# Patient Record
Sex: FEMALE | Race: WHITE | NOT HISPANIC OR LATINO | Employment: FULL TIME | URBAN - METROPOLITAN AREA
[De-identification: names, ages, dates, MRNs, and addresses within clinical notes are randomized per-mention and may not be internally consistent; named-entity substitution may affect disease eponyms.]

---

## 2022-11-04 RX ORDER — TRIAMCINOLONE ACETONIDE 5 MG/G
CREAM TOPICAL 2 TIMES DAILY PRN
COMMUNITY

## 2022-11-04 RX ORDER — NORTRIPTYLINE HYDROCHLORIDE 50 MG/1
50 CAPSULE ORAL 2 TIMES DAILY
COMMUNITY

## 2022-11-04 RX ORDER — ONDANSETRON 4 MG/1
4 TABLET, ORALLY DISINTEGRATING ORAL EVERY 8 HOURS PRN
COMMUNITY

## 2022-11-04 RX ORDER — MULTIVIT WITH MINERALS/LUTEIN
1 TABLET ORAL DAILY
COMMUNITY

## 2022-11-04 RX ORDER — TOPIRAMATE 50 MG/1
50 TABLET, FILM COATED ORAL 2 TIMES DAILY
COMMUNITY

## 2022-11-04 NOTE — PROGRESS NOTES
PTA medications updated by Medication Scribe prior to surgery via phone call with patient (last doses completed by Nurse)     Medication history sources: Patient  In the past week, patient estimated taking medication this percent of the time: Greater than 90%  Adherence assessment: Moderate    Significant changes made to the medication list:  Patient reports no longer taking the following meds (med scribe removed from PTA med list): prenatal; Calcium      Additional medication history information:   None    Medication reconciliation completed by provider prior to medication history? No    Time spent in this activity: 40 minutes    The information provided in this note is only as accurate as the sources available at the time of update(s)      Prior to Admission medications    Medication Sig Last Dose Taking? Auth Provider Long Term End Date   Acetaminophen (TYLENOL PO) Take 1,000 mg by mouth every 6 hours as needed for mild pain or fever 11/3/2022 Yes Reported, Patient     cimetidine (TAGAMET) 200 MG tablet Take 200 mg by mouth 2 times daily as needed  Yes Reported, Patient     citalopram (CELEXA) 20 MG tablet Take 20 mg by mouth every morning 11/4/2022 Yes Reported, Patient Yes    multivitamin (CENTRUM SILVER) tablet Take 1 tablet by mouth daily 11/3/2022 Yes Reported, Patient     nortriptyline (PAMELOR) 50 MG capsule Take 50 mg by mouth 2 times daily 11/3/2022 Yes Reported, Patient Yes    ondansetron (ZOFRAN ODT) 4 MG ODT tab Take 4 mg by mouth every 8 hours as needed for nausea  Yes Reported, Patient     topiramate (TOPAMAX) 50 MG tablet Take 50 mg by mouth 2 times daily  Yes Reported, Patient Yes    traZODone (DESYREL) 50 MG tablet Take 50 mg by mouth At Bedtime 11/3/2022 Yes Reported, Patient Yes    triamcinolone (ARISTOCORT HP) 0.5 % external cream Apply topically 2 times daily as needed  Yes Reported, Patient     loratadine (CLARITIN) 10 MG tablet Take 10 mg by mouth daily 10/28/2022  Reported, Patient      SUMAtriptan (IMITREX) 100 MG tablet Take 100 mg by mouth as needed for migraine 10/31/2022  Reported, Patient

## 2022-11-06 ENCOUNTER — ANESTHESIA EVENT (OUTPATIENT)
Dept: SURGERY | Facility: CLINIC | Age: 52
End: 2022-11-06
Payer: OTHER GOVERNMENT

## 2022-11-07 ENCOUNTER — APPOINTMENT (OUTPATIENT)
Dept: PHYSICAL THERAPY | Facility: CLINIC | Age: 52
End: 2022-11-07
Attending: ORTHOPAEDIC SURGERY
Payer: OTHER GOVERNMENT

## 2022-11-07 ENCOUNTER — ANESTHESIA (OUTPATIENT)
Dept: SURGERY | Facility: CLINIC | Age: 52
End: 2022-11-07
Payer: OTHER GOVERNMENT

## 2022-11-07 ENCOUNTER — HOSPITAL ENCOUNTER (OUTPATIENT)
Facility: CLINIC | Age: 52
Discharge: HOME | End: 2022-11-08
Attending: ORTHOPAEDIC SURGERY | Admitting: ORTHOPAEDIC SURGERY
Payer: OTHER GOVERNMENT

## 2022-11-07 ENCOUNTER — APPOINTMENT (OUTPATIENT)
Dept: GENERAL RADIOLOGY | Facility: CLINIC | Age: 52
End: 2022-11-07
Attending: ORTHOPAEDIC SURGERY
Payer: OTHER GOVERNMENT

## 2022-11-07 ENCOUNTER — APPOINTMENT (OUTPATIENT)
Dept: GENERAL RADIOLOGY | Facility: CLINIC | Age: 52
End: 2022-11-07
Attending: PHYSICIAN ASSISTANT
Payer: OTHER GOVERNMENT

## 2022-11-07 DIAGNOSIS — Z96.641 S/P TOTAL RIGHT HIP ARTHROPLASTY: Primary | ICD-10-CM

## 2022-11-07 PROCEDURE — 250N000025 HC SEVOFLURANE, PER MIN: Performed by: ORTHOPAEDIC SURGERY

## 2022-11-07 PROCEDURE — 97530 THERAPEUTIC ACTIVITIES: CPT | Mod: GP

## 2022-11-07 PROCEDURE — 360N000084 HC SURGERY LEVEL 4 W/ FLUORO, PER MIN: Performed by: ORTHOPAEDIC SURGERY

## 2022-11-07 PROCEDURE — 370N000017 HC ANESTHESIA TECHNICAL FEE, PER MIN: Performed by: ORTHOPAEDIC SURGERY

## 2022-11-07 PROCEDURE — 999N000179 XR SURGERY CARM FLUORO LESS THAN 5 MIN W STILLS

## 2022-11-07 PROCEDURE — 250N000013 HC RX MED GY IP 250 OP 250 PS 637: Performed by: ORTHOPAEDIC SURGERY

## 2022-11-07 PROCEDURE — 250N000013 HC RX MED GY IP 250 OP 250 PS 637: Performed by: PHYSICIAN ASSISTANT

## 2022-11-07 PROCEDURE — 250N000011 HC RX IP 250 OP 636: Performed by: ORTHOPAEDIC SURGERY

## 2022-11-07 PROCEDURE — 258N000001 HC RX 258: Performed by: ORTHOPAEDIC SURGERY

## 2022-11-07 PROCEDURE — 258N000003 HC RX IP 258 OP 636: Performed by: ANESTHESIOLOGY

## 2022-11-07 PROCEDURE — 999N000063 XR PELVIS AND HIP PORTABLE RIGHT 1 VIEW

## 2022-11-07 PROCEDURE — 97116 GAIT TRAINING THERAPY: CPT | Mod: GP

## 2022-11-07 PROCEDURE — 250N000011 HC RX IP 250 OP 636: Performed by: PHYSICIAN ASSISTANT

## 2022-11-07 PROCEDURE — 272N000001 HC OR GENERAL SUPPLY STERILE: Performed by: ORTHOPAEDIC SURGERY

## 2022-11-07 PROCEDURE — C1713 ANCHOR/SCREW BN/BN,TIS/BN: HCPCS | Performed by: ORTHOPAEDIC SURGERY

## 2022-11-07 PROCEDURE — 258N000003 HC RX IP 258 OP 636: Performed by: ORTHOPAEDIC SURGERY

## 2022-11-07 PROCEDURE — 710N000009 HC RECOVERY PHASE 1, LEVEL 1, PER MIN: Performed by: ORTHOPAEDIC SURGERY

## 2022-11-07 PROCEDURE — C1776 JOINT DEVICE (IMPLANTABLE): HCPCS | Performed by: ORTHOPAEDIC SURGERY

## 2022-11-07 PROCEDURE — 999N000141 HC STATISTIC PRE-PROCEDURE NURSING ASSESSMENT: Performed by: ORTHOPAEDIC SURGERY

## 2022-11-07 PROCEDURE — 250N000009 HC RX 250: Performed by: ANESTHESIOLOGY

## 2022-11-07 PROCEDURE — 258N000003 HC RX IP 258 OP 636: Performed by: PHYSICIAN ASSISTANT

## 2022-11-07 PROCEDURE — 250N000011 HC RX IP 250 OP 636: Performed by: ANESTHESIOLOGY

## 2022-11-07 PROCEDURE — 250N000009 HC RX 250: Performed by: ORTHOPAEDIC SURGERY

## 2022-11-07 PROCEDURE — 97161 PT EVAL LOW COMPLEX 20 MIN: CPT | Mod: GP

## 2022-11-07 DEVICE — IMPLANTABLE DEVICE
Type: IMPLANTABLE DEVICE | Site: HIP | Status: FUNCTIONAL
Brand: TAPERLOC COMPLETE PRIMARY FEMORAL

## 2022-11-07 DEVICE — IMPLANTABLE DEVICE
Type: IMPLANTABLE DEVICE | Site: HIP | Status: FUNCTIONAL
Brand: G7® ACETABULAR SYSTEM

## 2022-11-07 DEVICE — IMPLANTABLE DEVICE
Type: IMPLANTABLE DEVICE | Site: HIP | Status: FUNCTIONAL
Brand: G7® VIVACIT-E®

## 2022-11-07 DEVICE — IMPLANTABLE DEVICE
Type: IMPLANTABLE DEVICE | Site: HIP | Status: FUNCTIONAL
Brand: BIOLOX® DELTA MODULAR CERAMIC HEAD

## 2022-11-07 DEVICE — IMPLANTABLE DEVICE: Type: IMPLANTABLE DEVICE | Site: HIP | Status: FUNCTIONAL

## 2022-11-07 RX ORDER — HYDROMORPHONE HCL IN WATER/PF 6 MG/30 ML
0.2 PATIENT CONTROLLED ANALGESIA SYRINGE INTRAVENOUS EVERY 5 MIN PRN
Status: DISCONTINUED | OUTPATIENT
Start: 2022-11-07 | End: 2022-11-07 | Stop reason: HOSPADM

## 2022-11-07 RX ORDER — AMOXICILLIN 250 MG
1 CAPSULE ORAL 2 TIMES DAILY
Status: DISCONTINUED | OUTPATIENT
Start: 2022-11-07 | End: 2022-11-08 | Stop reason: HOSPADM

## 2022-11-07 RX ORDER — SUMATRIPTAN 50 MG/1
100 TABLET, FILM COATED ORAL EVERY 8 HOURS PRN
Status: DISCONTINUED | OUTPATIENT
Start: 2022-11-07 | End: 2022-11-08 | Stop reason: HOSPADM

## 2022-11-07 RX ORDER — NALOXONE HYDROCHLORIDE 0.4 MG/ML
0.4 INJECTION, SOLUTION INTRAMUSCULAR; INTRAVENOUS; SUBCUTANEOUS
Status: DISCONTINUED | OUTPATIENT
Start: 2022-11-07 | End: 2022-11-08 | Stop reason: HOSPADM

## 2022-11-07 RX ORDER — FAMOTIDINE 20 MG/1
20 TABLET, FILM COATED ORAL 2 TIMES DAILY
Status: DISCONTINUED | OUTPATIENT
Start: 2022-11-07 | End: 2022-11-08 | Stop reason: HOSPADM

## 2022-11-07 RX ORDER — NALOXONE HYDROCHLORIDE 0.4 MG/ML
0.2 INJECTION, SOLUTION INTRAMUSCULAR; INTRAVENOUS; SUBCUTANEOUS
Status: DISCONTINUED | OUTPATIENT
Start: 2022-11-07 | End: 2022-11-08 | Stop reason: HOSPADM

## 2022-11-07 RX ORDER — ACETAMINOPHEN 325 MG/1
650 TABLET ORAL EVERY 4 HOURS PRN
Status: DISCONTINUED | OUTPATIENT
Start: 2022-11-10 | End: 2022-11-08 | Stop reason: HOSPADM

## 2022-11-07 RX ORDER — FENTANYL CITRATE 0.05 MG/ML
25 INJECTION, SOLUTION INTRAMUSCULAR; INTRAVENOUS EVERY 5 MIN PRN
Status: DISCONTINUED | OUTPATIENT
Start: 2022-11-07 | End: 2022-11-07 | Stop reason: HOSPADM

## 2022-11-07 RX ORDER — ASPIRIN 81 MG/1
81 TABLET ORAL 2 TIMES DAILY
Status: DISCONTINUED | OUTPATIENT
Start: 2022-11-07 | End: 2022-11-08 | Stop reason: HOSPADM

## 2022-11-07 RX ORDER — ONDANSETRON 4 MG/1
4 TABLET, ORALLY DISINTEGRATING ORAL EVERY 6 HOURS PRN
Status: DISCONTINUED | OUTPATIENT
Start: 2022-11-07 | End: 2022-11-08 | Stop reason: HOSPADM

## 2022-11-07 RX ORDER — CEFAZOLIN SODIUM/WATER 2 G/20 ML
2 SYRINGE (ML) INTRAVENOUS SEE ADMIN INSTRUCTIONS
Status: DISCONTINUED | OUTPATIENT
Start: 2022-11-07 | End: 2022-11-07 | Stop reason: HOSPADM

## 2022-11-07 RX ORDER — TRAZODONE HYDROCHLORIDE 50 MG/1
50 TABLET, FILM COATED ORAL AT BEDTIME
Status: DISCONTINUED | OUTPATIENT
Start: 2022-11-07 | End: 2022-11-08 | Stop reason: HOSPADM

## 2022-11-07 RX ORDER — ASPIRIN 81 MG/1
81 TABLET ORAL 2 TIMES DAILY
Qty: 60 TABLET | Refills: 0 | Status: SHIPPED | OUTPATIENT
Start: 2022-11-07

## 2022-11-07 RX ORDER — CEFAZOLIN SODIUM/WATER 2 G/20 ML
2 SYRINGE (ML) INTRAVENOUS
Status: COMPLETED | OUTPATIENT
Start: 2022-11-07 | End: 2022-11-07

## 2022-11-07 RX ORDER — DEXAMETHASONE SODIUM PHOSPHATE 4 MG/ML
INJECTION, SOLUTION INTRA-ARTICULAR; INTRALESIONAL; INTRAMUSCULAR; INTRAVENOUS; SOFT TISSUE PRN
Status: DISCONTINUED | OUTPATIENT
Start: 2022-11-07 | End: 2022-11-07

## 2022-11-07 RX ORDER — CEFAZOLIN SODIUM 2 G/100ML
2 INJECTION, SOLUTION INTRAVENOUS EVERY 8 HOURS
Status: COMPLETED | OUTPATIENT
Start: 2022-11-07 | End: 2022-11-07

## 2022-11-07 RX ORDER — OXYCODONE HYDROCHLORIDE 5 MG/1
5 TABLET ORAL EVERY 4 HOURS PRN
Status: DISCONTINUED | OUTPATIENT
Start: 2022-11-07 | End: 2022-11-07 | Stop reason: HOSPADM

## 2022-11-07 RX ORDER — HYDROMORPHONE HCL IN WATER/PF 6 MG/30 ML
0.4 PATIENT CONTROLLED ANALGESIA SYRINGE INTRAVENOUS
Status: DISCONTINUED | OUTPATIENT
Start: 2022-11-07 | End: 2022-11-08 | Stop reason: HOSPADM

## 2022-11-07 RX ORDER — CITALOPRAM HYDROBROMIDE 20 MG/1
20 TABLET ORAL AT BEDTIME
Status: DISCONTINUED | OUTPATIENT
Start: 2022-11-08 | End: 2022-11-08 | Stop reason: HOSPADM

## 2022-11-07 RX ORDER — MAGNESIUM HYDROXIDE 1200 MG/15ML
LIQUID ORAL PRN
Status: DISCONTINUED | OUTPATIENT
Start: 2022-11-07 | End: 2022-11-07 | Stop reason: HOSPADM

## 2022-11-07 RX ORDER — PROPOFOL 10 MG/ML
INJECTION, EMULSION INTRAVENOUS PRN
Status: DISCONTINUED | OUTPATIENT
Start: 2022-11-07 | End: 2022-11-07

## 2022-11-07 RX ORDER — HYDROMORPHONE HYDROCHLORIDE 1 MG/ML
INJECTION, SOLUTION INTRAMUSCULAR; INTRAVENOUS; SUBCUTANEOUS PRN
Status: DISCONTINUED | OUTPATIENT
Start: 2022-11-07 | End: 2022-11-07

## 2022-11-07 RX ORDER — POLYETHYLENE GLYCOL 3350 17 G/17G
17 POWDER, FOR SOLUTION ORAL DAILY
Status: DISCONTINUED | OUTPATIENT
Start: 2022-11-08 | End: 2022-11-08 | Stop reason: HOSPADM

## 2022-11-07 RX ORDER — ONDANSETRON 2 MG/ML
4 INJECTION INTRAMUSCULAR; INTRAVENOUS EVERY 30 MIN PRN
Status: DISCONTINUED | OUTPATIENT
Start: 2022-11-07 | End: 2022-11-07 | Stop reason: HOSPADM

## 2022-11-07 RX ORDER — TOPIRAMATE 50 MG/1
50 TABLET, FILM COATED ORAL 2 TIMES DAILY
Status: DISCONTINUED | OUTPATIENT
Start: 2022-11-07 | End: 2022-11-08 | Stop reason: HOSPADM

## 2022-11-07 RX ORDER — ACETAMINOPHEN 325 MG/1
975 TABLET ORAL EVERY 8 HOURS
Status: DISCONTINUED | OUTPATIENT
Start: 2022-11-07 | End: 2022-11-08 | Stop reason: HOSPADM

## 2022-11-07 RX ORDER — ONDANSETRON 2 MG/ML
INJECTION INTRAMUSCULAR; INTRAVENOUS PRN
Status: DISCONTINUED | OUTPATIENT
Start: 2022-11-07 | End: 2022-11-07

## 2022-11-07 RX ORDER — FENTANYL CITRATE 50 UG/ML
INJECTION, SOLUTION INTRAMUSCULAR; INTRAVENOUS PRN
Status: DISCONTINUED | OUTPATIENT
Start: 2022-11-07 | End: 2022-11-07

## 2022-11-07 RX ORDER — OXYCODONE HYDROCHLORIDE 5 MG/1
5 TABLET ORAL EVERY 4 HOURS PRN
Status: DISCONTINUED | OUTPATIENT
Start: 2022-11-07 | End: 2022-11-08 | Stop reason: HOSPADM

## 2022-11-07 RX ORDER — PROCHLORPERAZINE MALEATE 10 MG
10 TABLET ORAL EVERY 6 HOURS PRN
Status: DISCONTINUED | OUTPATIENT
Start: 2022-11-07 | End: 2022-11-08 | Stop reason: HOSPADM

## 2022-11-07 RX ORDER — TRANEXAMIC ACID 650 MG/1
1950 TABLET ORAL ONCE
Status: COMPLETED | OUTPATIENT
Start: 2022-11-07 | End: 2022-11-07

## 2022-11-07 RX ORDER — SODIUM CHLORIDE, SODIUM LACTATE, POTASSIUM CHLORIDE, CALCIUM CHLORIDE 600; 310; 30; 20 MG/100ML; MG/100ML; MG/100ML; MG/100ML
INJECTION, SOLUTION INTRAVENOUS CONTINUOUS
Status: DISCONTINUED | OUTPATIENT
Start: 2022-11-07 | End: 2022-11-08 | Stop reason: HOSPADM

## 2022-11-07 RX ORDER — SODIUM CHLORIDE, SODIUM LACTATE, POTASSIUM CHLORIDE, CALCIUM CHLORIDE 600; 310; 30; 20 MG/100ML; MG/100ML; MG/100ML; MG/100ML
INJECTION, SOLUTION INTRAVENOUS CONTINUOUS
Status: DISCONTINUED | OUTPATIENT
Start: 2022-11-07 | End: 2022-11-07 | Stop reason: HOSPADM

## 2022-11-07 RX ORDER — LIDOCAINE 40 MG/G
CREAM TOPICAL
Status: DISCONTINUED | OUTPATIENT
Start: 2022-11-07 | End: 2022-11-08 | Stop reason: HOSPADM

## 2022-11-07 RX ORDER — HYDROMORPHONE HCL IN WATER/PF 6 MG/30 ML
0.2 PATIENT CONTROLLED ANALGESIA SYRINGE INTRAVENOUS
Status: DISCONTINUED | OUTPATIENT
Start: 2022-11-07 | End: 2022-11-08 | Stop reason: HOSPADM

## 2022-11-07 RX ORDER — OXYCODONE HYDROCHLORIDE 5 MG/1
10 TABLET ORAL EVERY 4 HOURS PRN
Status: DISCONTINUED | OUTPATIENT
Start: 2022-11-07 | End: 2022-11-08 | Stop reason: HOSPADM

## 2022-11-07 RX ORDER — ACETAMINOPHEN 325 MG/1
650 TABLET ORAL EVERY 4 HOURS PRN
Qty: 100 TABLET | Refills: 0 | Status: SHIPPED | OUTPATIENT
Start: 2022-11-07

## 2022-11-07 RX ORDER — BISACODYL 10 MG
10 SUPPOSITORY, RECTAL RECTAL DAILY PRN
Status: DISCONTINUED | OUTPATIENT
Start: 2022-11-07 | End: 2022-11-08 | Stop reason: HOSPADM

## 2022-11-07 RX ORDER — AMOXICILLIN 250 MG
1-2 CAPSULE ORAL 2 TIMES DAILY
Qty: 30 TABLET | Refills: 0 | Status: SHIPPED | OUTPATIENT
Start: 2022-11-07

## 2022-11-07 RX ORDER — OXYCODONE HYDROCHLORIDE 5 MG/1
5-10 TABLET ORAL EVERY 4 HOURS PRN
Qty: 26 TABLET | Refills: 0 | Status: SHIPPED | OUTPATIENT
Start: 2022-11-07

## 2022-11-07 RX ORDER — ONDANSETRON 2 MG/ML
4 INJECTION INTRAMUSCULAR; INTRAVENOUS EVERY 6 HOURS PRN
Status: DISCONTINUED | OUTPATIENT
Start: 2022-11-07 | End: 2022-11-08 | Stop reason: HOSPADM

## 2022-11-07 RX ORDER — POLYETHYLENE GLYCOL 3350 17 G/17G
1 POWDER, FOR SOLUTION ORAL DAILY
Qty: 7 PACKET | Refills: 0 | Status: SHIPPED | OUTPATIENT
Start: 2022-11-07

## 2022-11-07 RX ORDER — NORTRIPTYLINE HYDROCHLORIDE 50 MG/1
50 CAPSULE ORAL 2 TIMES DAILY
Status: DISCONTINUED | OUTPATIENT
Start: 2022-11-07 | End: 2022-11-08 | Stop reason: HOSPADM

## 2022-11-07 RX ORDER — LIDOCAINE HYDROCHLORIDE 20 MG/ML
INJECTION, SOLUTION INFILTRATION; PERINEURAL PRN
Status: DISCONTINUED | OUTPATIENT
Start: 2022-11-07 | End: 2022-11-07

## 2022-11-07 RX ORDER — ACETAMINOPHEN 325 MG/1
975 TABLET ORAL ONCE
Status: COMPLETED | OUTPATIENT
Start: 2022-11-07 | End: 2022-11-07

## 2022-11-07 RX ORDER — ONDANSETRON 4 MG/1
4 TABLET, ORALLY DISINTEGRATING ORAL EVERY 30 MIN PRN
Status: DISCONTINUED | OUTPATIENT
Start: 2022-11-07 | End: 2022-11-07 | Stop reason: HOSPADM

## 2022-11-07 RX ADMIN — CEFAZOLIN SODIUM 2 G: 2 INJECTION, SOLUTION INTRAVENOUS at 22:41

## 2022-11-07 RX ADMIN — FAMOTIDINE 20 MG: 20 TABLET ORAL at 13:41

## 2022-11-07 RX ADMIN — NORTRIPTYLINE HYDROCHLORIDE 50 MG: 50 CAPSULE ORAL at 21:08

## 2022-11-07 RX ADMIN — HYDROMORPHONE HYDROCHLORIDE 0.5 MG: 1 INJECTION, SOLUTION INTRAMUSCULAR; INTRAVENOUS; SUBCUTANEOUS at 09:07

## 2022-11-07 RX ADMIN — ASPIRIN 81 MG: 81 TABLET, COATED ORAL at 21:09

## 2022-11-07 RX ADMIN — ACETAMINOPHEN 975 MG: 325 TABLET, FILM COATED ORAL at 21:08

## 2022-11-07 RX ADMIN — ACETAMINOPHEN 975 MG: 325 TABLET, FILM COATED ORAL at 06:06

## 2022-11-07 RX ADMIN — SODIUM CHLORIDE, POTASSIUM CHLORIDE, SODIUM LACTATE AND CALCIUM CHLORIDE: 600; 310; 30; 20 INJECTION, SOLUTION INTRAVENOUS at 21:10

## 2022-11-07 RX ADMIN — HYDROMORPHONE HYDROCHLORIDE 0.5 MG: 1 INJECTION, SOLUTION INTRAMUSCULAR; INTRAVENOUS; SUBCUTANEOUS at 08:08

## 2022-11-07 RX ADMIN — SENNOSIDES AND DOCUSATE SODIUM 1 TABLET: 50; 8.6 TABLET ORAL at 21:09

## 2022-11-07 RX ADMIN — OXYCODONE HYDROCHLORIDE 5 MG: 5 TABLET ORAL at 21:09

## 2022-11-07 RX ADMIN — CEFAZOLIN SODIUM 2 G: 2 INJECTION, SOLUTION INTRAVENOUS at 14:59

## 2022-11-07 RX ADMIN — PROPOFOL 200 MG: 10 INJECTION, EMULSION INTRAVENOUS at 07:33

## 2022-11-07 RX ADMIN — Medication 2 G: at 07:36

## 2022-11-07 RX ADMIN — LIDOCAINE HYDROCHLORIDE 100 MG: 20 INJECTION, SOLUTION INFILTRATION; PERINEURAL at 07:33

## 2022-11-07 RX ADMIN — TRAZODONE HYDROCHLORIDE 50 MG: 50 TABLET ORAL at 21:09

## 2022-11-07 RX ADMIN — SODIUM CHLORIDE, POTASSIUM CHLORIDE, SODIUM LACTATE AND CALCIUM CHLORIDE: 600; 310; 30; 20 INJECTION, SOLUTION INTRAVENOUS at 07:28

## 2022-11-07 RX ADMIN — ONDANSETRON 4 MG: 2 INJECTION INTRAMUSCULAR; INTRAVENOUS at 09:07

## 2022-11-07 RX ADMIN — PHENYLEPHRINE HYDROCHLORIDE 50 MCG: 10 INJECTION INTRAVENOUS at 08:31

## 2022-11-07 RX ADMIN — ASPIRIN 81 MG: 81 TABLET, COATED ORAL at 13:41

## 2022-11-07 RX ADMIN — NORTRIPTYLINE HYDROCHLORIDE 50 MG: 50 CAPSULE ORAL at 14:59

## 2022-11-07 RX ADMIN — TOPIRAMATE 50 MG: 50 TABLET, FILM COATED ORAL at 13:41

## 2022-11-07 RX ADMIN — TOPIRAMATE 50 MG: 50 TABLET, FILM COATED ORAL at 21:09

## 2022-11-07 RX ADMIN — FENTANYL CITRATE 100 MCG: 50 INJECTION, SOLUTION INTRAMUSCULAR; INTRAVENOUS at 07:33

## 2022-11-07 RX ADMIN — TRANEXAMIC ACID 1950 MG: 650 TABLET ORAL at 06:06

## 2022-11-07 RX ADMIN — FAMOTIDINE 20 MG: 20 TABLET ORAL at 21:09

## 2022-11-07 RX ADMIN — FENTANYL CITRATE 100 MCG: 50 INJECTION, SOLUTION INTRAMUSCULAR; INTRAVENOUS at 08:03

## 2022-11-07 RX ADMIN — OXYCODONE HYDROCHLORIDE 5 MG: 5 TABLET ORAL at 14:59

## 2022-11-07 RX ADMIN — DEXAMETHASONE SODIUM PHOSPHATE 4 MG: 4 INJECTION, SOLUTION INTRA-ARTICULAR; INTRALESIONAL; INTRAMUSCULAR; INTRAVENOUS; SOFT TISSUE at 07:50

## 2022-11-07 RX ADMIN — ACETAMINOPHEN 975 MG: 325 TABLET, FILM COATED ORAL at 13:41

## 2022-11-07 RX ADMIN — MIDAZOLAM 2 MG: 1 INJECTION INTRAMUSCULAR; INTRAVENOUS at 07:29

## 2022-11-07 ASSESSMENT — ACTIVITIES OF DAILY LIVING (ADL)
ADLS_ACUITY_SCORE: 22

## 2022-11-07 NOTE — ANESTHESIA PREPROCEDURE EVALUATION
Prior to Admission medications    Medication Sig Start Date End Date Taking? Authorizing Provider   Acetaminophen (TYLENOL PO) Take 1,000 mg by mouth every 6 hours as needed for mild pain or fever   Yes Reported, Patient   cimetidine (TAGAMET) 200 MG tablet Take 200 mg by mouth 2 times daily as needed   Yes Reported, Patient   citalopram (CELEXA) 20 MG tablet Take 20 mg by mouth every morning   Yes Reported, Patient   multivitamin (CENTRUM SILVER) tablet Take 1 tablet by mouth daily   Yes Reported, Patient   nortriptyline (PAMELOR) 50 MG capsule Take 50 mg by mouth 2 times daily   Yes Reported, Patient   ondansetron (ZOFRAN ODT) 4 MG ODT tab Take 4 mg by mouth every 8 hours as needed for nausea   Yes Reported, Patient   topiramate (TOPAMAX) 50 MG tablet Take 50 mg by mouth 2 times daily   Yes Reported, Patient   traZODone (DESYREL) 50 MG tablet Take 50 mg by mouth At Bedtime   Yes Reported, Patient   triamcinolone (ARISTOCORT HP) 0.5 % external cream Apply topically 2 times daily as needed   Yes Reported, Patient   loratadine (CLARITIN) 10 MG tablet Take 10 mg by mouth daily    Reported, Patient   SUMAtriptan (IMITREX) 100 MG tablet Take 100 mg by mouth as needed for migraine    Reported, Patient     Current Facility-Administered Medications Ordered in Epic   Medication Dose Route Frequency Last Rate Last Admin     ceFAZolin Sodium (ANCEF) injection 2 g  2 g Intravenous Pre-Op/Pre-procedure x 1 dose         ceFAZolin Sodium (ANCEF) injection 2 g  2 g Intravenous See Admin Instructions         fentaNYL (PF) (SUBLIMAZE) injection 25 mcg  25 mcg Intravenous Q5 Min PRN         HYDROmorphone (DILAUDID) injection 0.2 mg  0.2 mg Intravenous Q5 Min PRN         lactated ringers infusion   Intravenous Continuous         lactated ringers infusion   Intravenous Continuous         lidocaine 1 % 0.1-1 mL  0.1-1 mL Other Q1H PRN         ondansetron (ZOFRAN ODT) ODT tab 4 mg  4 mg Oral Q30 Min PRN        Or     ondansetron (ZOFRAN)  "injection 4 mg  4 mg Intravenous Q30 Min PRN         oxyCODONE (ROXICODONE) tablet 5 mg  5 mg Oral Q4H PRN         prochlorperazine (COMPAZINE) injection 5 mg  5 mg Intravenous Q6H PRN         ropivacaine (NAROPIN) 300 mg, ketorolac (TORADOL) 30 mg, EPINEPHrine (ADRENALIN) 0.6 mg in sodium chloride 0.9 % 100 mL (ORTHO AHMET STANDARD DOSE)   INTRA-ARTICULAR On Call to OR         sodium chloride (PF) 0.9% PF flush 3 mL  3 mL Intracatheter Q8H         sodium chloride (PF) 0.9% PF flush 3 mL  3 mL Intracatheter q1 min prn         No current Epic-ordered outpatient medications on file.       lactated ringers       lactated ringers       No results for input(s): ABO, RH in the last 19455 hours.  No results for input(s): HCG in the last 01192 hours.  No results found for this or any previous visit (from the past 744 hour(s)).Anesthesia Pre-Procedure Evaluation    Patient: Yoly Chan   MRN: [id number] : 1970        Procedure : Procedure(s):  RIGHT TOTAL HIP ARTHROPLASTY, DIRECT ANTERIOR APPROACH          Past Medical History:   Diagnosis Date     Arthritis      Bilateral carpal tunnel syndrome      Depression      Depressive disorder      NIRMAL (generalized anxiety disorder)      Gastro-oesophageal reflux disease      Migraine      Migraine     1-2x monthly     Mood disorder (H)      Obesity      Sleep disturbance       Past Surgical History:   Procedure Laterality Date     ARTHROPLASTY HIP  2014    Procedure: ARTHROPLASTY HIP;  Surgeon: Barak Taylor MD;  Location:  OR     ORTHOPEDIC SURGERY      \"plates and screws ORIF left hip and femur      No Known Allergies   Social History     Tobacco Use     Smoking status: Never     Smokeless tobacco: Never   Substance Use Topics     Alcohol use: Yes     Comment: 2-3 drinks per week (beer cans)      Wt Readings from Last 1 Encounters:   22 111.1 kg (245 lb)        Anesthesia Evaluation   Pt has had prior anesthetic.     No history of " anesthetic complications       ROS/MED HX  ENT/Pulmonary:    (-) sleep apnea   Neurologic:     (+) migraines,     Cardiovascular:       METS/Exercise Tolerance:     Hematologic:       Musculoskeletal:       GI/Hepatic:     (+) GERD,     Renal/Genitourinary:       Endo:     (+) Obesity,     Psychiatric/Substance Use:     (+) psychiatric history depression     Infectious Disease:       Malignancy:       Other:            Physical Exam    Airway        Mallampati: I    Neck ROM: full     Respiratory Devices and Support         Dental  no notable dental history     (+) caps      Cardiovascular   cardiovascular exam normal          Pulmonary   pulmonary exam normal                OUTSIDE LABS:  CBC:   Lab Results   Component Value Date    HGB 10.5 (L) 05/24/2014    HGB 11.3 (L) 05/23/2014     05/25/2014     05/22/2014     BMP:   Lab Results   Component Value Date    CR 0.70 05/22/2014     COAGS: No results found for: PTT, INR, FIBR  POC:   Lab Results   Component Value Date    HCG Negative 05/22/2014     HEPATIC: No results found for: ALBUMIN, PROTTOTAL, ALT, AST, GGT, ALKPHOS, BILITOTAL, BILIDIRECT, JAVID  OTHER: No results found for: PH, LACT, A1C, AMANDA, PHOS, MAG, LIPASE, AMYLASE, TSH, T4, T3, CRP, SED    Anesthesia Plan    ASA Status:  2   NPO Status:  NPO Appropriate    Anesthesia Type: General.     - Airway: LMA   Induction: Intravenous.   Maintenance: Balanced.        Consents    Anesthesia Plan(s) and associated risks, benefits, and realistic alternatives discussed. Questions answered and patient/representative(s) expressed understanding.    - Discussed:     - Discussed with:  Patient         Postoperative Care    Pain management: IV analgesics.   PONV prophylaxis: Ondansetron (or other 5HT-3)     Comments:                Raymon Kwon MD

## 2022-11-07 NOTE — PROGRESS NOTES
11/07/22 1600   Appointment Info   Signing Clinician's Name / Credentials (PT) Francie Cano, PT, DPT   Quick Adds   Quick Adds Certification       Present no   Living Environment   People in Home spouse   Current Living Arrangements house   Home Accessibility stairs to enter home   Number of Stairs, Main Entrance 2   Stair Railings, Main Entrance none   Transportation Anticipated car, drives self;family or friend will provide   Living Environment Comments Patient lives with her spouse in a house.  There are 1 + 1 steps to access the home.  All patient's needs are met on the main floor of the home but she does have a exercise bike and treadmill in the basement.  There are 10-12 steps with single rail to basement.  Patient drives at baseline, plans to have  assist with transportation.   Self-Care   Usual Activity Tolerance good   Current Activity Tolerance good   Equipment Currently Used at Home none   Fall history within last six months no   Activity/Exercise/Self-Care Comment Patient reports baseline independence with I/ADLs and mobility.  She was an independent community ambulator.  Patient owns a standard walker and single point cane from L LUIS ANTONIO 8 years ago.   General Information   Onset of Illness/Injury or Date of Surgery 11/07/22   Referring Physician Jahaira Mtz   Patient/Family Therapy Goals Statement (PT) Patient would like to discharge home with .   Pertinent History of Current Problem (include personal factors and/or comorbidities that impact the POC) 52 year old female s/p right total hip arthroplasty   Existing Precautions/Restrictions fall;weight bearing   Weight-Bearing Status - RLE weight-bearing as tolerated   Cognition   Affect/Mental Status (Cognition) WFL   Orientation Status (Cognition) oriented x 4   Follows Commands (Cognition) WFL   Pain Assessment   Patient Currently in Pain Yes, see Vital Sign flowsheet  (3/10 right hip)   Integumentary/Edema    Integumentary/Edema Comments Incision right hip   Posture    Posture Forward head position   Range of Motion (ROM)   Range of Motion ROM is WFL   Strength (Manual Muscle Testing)   Strength (Manual Muscle Testing) Able to perform R SLR;Able to perform L SLR;Deficits observed during functional mobility   Bed Mobility   Comment, (Bed Mobility) Patient performs supine <> sit transfer IND, HOB flat and no rails per home set-up.   Transfers   Comment, (Transfers) Patient performs sit <> stand transfer from bed with SBA, demonstrates safe hand placement following education.   Gait/Stairs (Locomotion)   Distance in Feet (Gait) 15' eval + 125' treatment   Comment, (Gait/Stairs) Patient ambulates 15' in room with FWW, progressing from CGA to SBA.  Patient ambulating with step-through gait pattern, gait speed slowed but steady.   Balance   Balance Comments Impaired dynamic balance   Sensory Examination   Sensory Perception Comments Baseline neuropathy L foot   Clinical Impression   Criteria for Skilled Therapeutic Intervention Yes, treatment indicated   PT Diagnosis (PT) Impaired functional mobility   Influenced by the following impairments Right hip incision, right lower extremity pain, weakness, impaired balance, decreased activity tolerance   Functional limitations due to impairments Impaired independence with bed mobility, transfers, gait, and stair negotiation   Clinical Presentation (PT Evaluation Complexity) Stable/Uncomplicated   Clinical Presentation Rationale Clinical judgement, PMH, social support   Clinical Decision Making (Complexity) low complexity   Planned Therapy Interventions (PT) balance training;bed mobility training;cryotherapy;gait training;home exercise program;patient/family education;neuromuscular re-education;postural re-education;stair training;strengthening;transfer training;progressive activity/exercise   Anticipated Equipment Needs at Discharge (PT)   (Patient owns a standard walker and SPC,  may benefit from wheels on walker.)   Risk & Benefits of therapy have been explained evaluation/treatment results reviewed;care plan/treatment goals reviewed;risks/benefits reviewed;participants voiced agreement with care plan;participants included;patient   PT Total Evaluation Time   PT Eval, Low Complexity Minutes (02904) 10   Plan of Care Review   Plan of Care Reviewed With patient   Therapy Certification   Start of care date 11/07/22   Certification date from 11/07/22   Certification date to 11/14/22   Medical Diagnosis R LUIS ANTONIO   Physical Therapy Goals   PT Frequency 2x/day   PT Predicted Duration/Target Date for Goal Attainment 11/08/22   PT Goals Bed Mobility;Transfers;Gait;Stairs   PT: Bed Mobility Independent;Supine to/from sit;Goal Met   PT: Transfers Supervision/stand-by assist;Sit to/from stand;Bed to/from chair;Assistive device;Goal Met   PT: Gait Supervision/stand-by assist;100 feet;Rolling walker;Goal Met   PT: Stairs 2 stairs;Minimal assist;Assistive device   Interventions   Interventions Quick Adds Therapeutic Procedure;Gait Training;Therapeutic Activity   Therapeutic Procedure/Exercise   Ther. Procedure: strength, endurance, ROM, flexibillity Minutes (78470) 5  (Billed with TA)   Symptoms Noted During/After Treatment none   Treatment Detail/Skilled Intervention While supine in bed, patient educated for ankle pumps/circumduction, quad sets, glute squeezes, heel slides, SLRs, and LAQs.  Handout provided.  Patient familiar with post-LUIS ANTONIO exercises, was performing prior to surgery.  Patient encouraged to continue HEP for circulatory and strength benefits.   Therapeutic Activity   Therapeutic Activities: dynamic activities to improve functional performance Minutes (27244) 8   Symptoms Noted During/After Treatment None   Treatment Detail/Skilled Intervention PT:  Patient greeted supine in bed.  Patient educated on role of PT in acute care setting.  Patient agreeble to PT session despite 3/10 right hip pain.   Patient reporting L LUIS ANTONIO 8 years ago.  Patient IND for bed mobility with HOB flat and no bed rails per home set-up, exits to left.  Patient maintains static sitting balance at EOB x3 minutes with SBA, no c/o dizziness.  Patient performs x3 sit <> stands from bed with SBA, additional sit <> stand from chair with armrests with SBA.  Patient requesting to return to bed at end of session to elevate and ice R LE, IND for sit > supine.  Patient left with call light in reach and bed alarm activated.   Gait Training   Gait Training Minutes (79149) 15   Symptoms Noted During/After Treatment (Gait Training) fatigue   Treatment Detail/Skilled Intervention Patient ambulates 125' with FWW and SBA, demonstrates step-through gait pattern with no overt loss of balance.  One cue for upright posture with forward gaze with immediate improvement.  Patient educated for stair negotiation, completes x1 platform step with FWW and CGA (plus stabilizing walker).  Intermittent cues for step sequencing on stairs, patient with good adherence.   PT Discharge Planning   PT Plan 2 platform steps with spouse assisting, gait with walker   PT Discharge Recommendation (DC Rec)   (defer to ortho)   PT Rationale for DC Rec Patient presents below her IND baseline following R LUIS ANTONIO, currently IND for bed mob but needing SBA for transfers and ambulation with FWW.  Patient has two steps to access home.  Pending safe negotiation of stairs, anticipate patient will be safe to return home with assistance from .  Patient to continue walking regularly and performing HEP at discharge to address remaining strength, balance, and endurance deficits.  Patient owns a standard walker and SPC, may benefit from wheels for walker.   Total Session Time   Timed Code Treatment Minutes 28   Total Session Time (sum of timed and untimed services) 38     [facility]  OUTPATIENT PHYSICAL THERAPY EVALUATION  PLAN OF TREATMENT FOR OUTPATIENT  REHABILITATION  (COMPLETE FOR INITIAL CLAIMS ONLY)  Patient's Last Name, First Name, M.I.  YOB: 1970  Yoly Chan                        Provider's Name  [facility] Medical Record No.  [id number]                             Onset Date:  11/07/22   Start of Care Date:  11/07/22   Type:     _X_PT   ___OT   ___SLP Medical Diagnosis:  R LUIS ANTONIO              PT Diagnosis:  Impaired functional mobility Visits from SOC:  1     See note for plan of treatment, functional goals and certification details    I CERTIFY THE NEED FOR THESE SERVICES FURNISHED UNDER        THIS PLAN OF TREATMENT AND WHILE UNDER MY CARE     (Physician co-signature of this document indicates review and certification of the therapy plan).

## 2022-11-07 NOTE — PROGRESS NOTES
Arrived from Recovery room.  A&O, able to make needs known.   Oriented to room/unit.  Mild surgical pain, ice pack applied.  Pt's  at the bedside.  Ortho Stoplight Tool discussed w/ pt.

## 2022-11-07 NOTE — ANESTHESIA PROCEDURE NOTES
Airway    Staff -        CRNA: Dana Acosta APRN CRNA       Performed By: CRNAIndications and Patient Condition       Indications for airway management: namrata-procedural       Induction type:intravenous       Mask difficulty assessment: 0 - not attempted    Final Airway Details       Final airway type: supraglottic airway    Supraglottic Airway Details        Type: LMA       Brand: Ambu AuraGain       LMA size: 4    Post intubation assessment        Placement verified by: capnometry, equal breath sounds and chest rise        Number of attempts at approach: 1       Secured with: pink tape       Ease of procedure: easy       Dentition: Intact and Unchanged

## 2022-11-07 NOTE — OP NOTE
11/7/2022    Yoly Chan    Preoperative diagnosis: End-stage osteoarthritis right hip    Postoperative diagnosis: Same    Procedure: Total hip replacement right hip, direct anterior approach    Anesthesia: Gen.    Surgeon: Dr. Barak Taylor    Asst.: Jahaira Mtz PA-C    Description of procedure:  The patient is brought to the operating, supine upon a gurney.Preoperative antibiotic were given, as well as tranexamic acid.Gen. Anesthesia was induced. A Coleman catheter was placed. The patient was placed in the supine position on the Shannon table.  Preoperative  views of both hips are made.    The right lower extremity, flank, and anterior abdomen are prepped and draped in customary fashion. A brief timeout was held verify the procedure and laterality.    A slightly oblique incision was made over the tensor fascia segundo muscle. Dissection was carried out over the muscular fascia. The muscle was dissected off the fascia,and retracted laterally. The circumflex vessels were identified,cauterized, and divided.Cobra retractors were placed superiorly and inferiorly around the femoral neck. The hip capsule was opened in a T fashion, and the anterior capsule was excised. The Cobra retractors were now placed directly around the femoral neck. The osteotomy was made with an oscillating saw, and the femoral head and neck were extracted.A power corkscrew was utilized. During the osteotomy and extraction of the femoral head,a small amount of traction was placed on the limb. The labrum was debrided. The acetabulum was reamed with spherical reamers up to 51  mm. A Biomet G7 52 mm shell was impacted in place. A single 30 mm screw was placed up into the ilium, and a trial liner was placed.    All traction was released from the limb. The hip was extended, adducted, and externally rotated. A bone hook was used to keep the proximal femur from impinging on the acetabulum.The table hook was placed under the proximal femur,  and the femur was lifted anteriorly. Releases were performed on the medial surface of the greater trochanter, to facilitate placement of retractors. Broaching was performed with the Katie Biomet taperloc broaches up to size 12.  At intervals during the broaching process, x-rays were made to ensure proper placement. Ultimately, a size 12 taperloc stem was chosen and placed. Based on trial reductions, the standard offset was chosen.     A standard acetabular liner, size E, with a 36 mm Internal diameter was placed. We ultimately choose the standard femoral head, 36 mm in diameter. This was impacted on the taper, and final reductions were performed. Based on C-arm views, leg lengths appeared to be equal. The hip was stable even when traction was applied with a bone hook.    The wound was copiously irrigated with pulsatile lavage and saline solution. The capsule was re-approximated with the tagging sutures. The soft tissues were infiltrated with Marcaine and Toradol. The subcutaneous tissues were closed with 2-0 Vicryl, after closure of the fascia with 0 Vicryl. The skin was reapproximated with skin clips.    A bulky sterile dressing was applied. The patient left the room in satisfactory condition. Final counts were correct.

## 2022-11-07 NOTE — INTERVAL H&P NOTE
"I have reviewed the surgical (or preoperative) H&P that is linked to this encounter, and examined the patient. There are no significant changes    Clinical Conditions Present on Arrival:  Clinically Significant Risk Factors Present on Admission                    # Obesity: Estimated body mass index is 38.37 kg/m  as calculated from the following:    Height as of this encounter: 1.702 m (5' 7\").    Weight as of this encounter: 111.1 kg (245 lb).       "

## 2022-11-07 NOTE — ANESTHESIA POSTPROCEDURE EVALUATION
Patient: Yoly Chan    Procedure: Procedure(s):  RIGHT TOTAL HIP ARTHROPLASTY, DIRECT ANTERIOR APPROACH       Anesthesia Type:  General    Note:  Disposition: Inpatient   Postop Pain Control: Uneventful            Sign Out: Well controlled pain   PONV: No   Neuro/Psych: Uneventful            Sign Out: Acceptable/Baseline neuro status   Airway/Respiratory: Uneventful            Sign Out: Acceptable/Baseline resp. status   CV/Hemodynamics: Uneventful            Sign Out: Acceptable CV status; No obvious hypovolemia; No obvious fluid overload   Other NRE: NONE   DID A NON-ROUTINE EVENT OCCUR? No           Last vitals:  Vitals Value Taken Time   /65 11/07/22 1200   Temp 35.9  C (96.6  F) 11/07/22 0954   Pulse 96 11/07/22 1200   Resp 11 11/07/22 1200   SpO2 99 % 11/07/22 1200       Electronically Signed By: Raymon Kwon MD  November 7, 2022  2:14 PM

## 2022-11-07 NOTE — ANESTHESIA CARE TRANSFER NOTE
Patient: Yoly Chan    Procedure: Procedure(s):  RIGHT TOTAL HIP ARTHROPLASTY, DIRECT ANTERIOR APPROACH       Diagnosis: Primary osteoarthritis of right hip [M16.11]  Diagnosis Additional Information: No value filed.    Anesthesia Type:   General     Note:    Oropharynx: oropharynx clear of all foreign objects and spontaneously breathing  Level of Consciousness: awake  Oxygen Supplementation: face mask  Level of Supplemental Oxygen (L/min / FiO2): 6  Independent Airway: airway patency satisfactory and stable  Dentition: dentition unchanged  Vital Signs Stable: post-procedure vital signs reviewed and stable  Report to RN Given: handoff report given  Patient transferred to: PACU    Handoff Report: Identifed the Patient, Identified the Reponsible Provider, Reviewed the pertinent medical history, Discussed the surgical course, Reviewed Intra-OP anesthesia mangement and issues during anesthesia, Set expectations for post-procedure period and Allowed opportunity for questions and acknowledgement of understanding      Vitals:  Vitals Value Taken Time   /74 11/07/22 0954   Temp     Pulse 116 11/07/22 0955   Resp 10 11/07/22 0955   SpO2 98 % 11/07/22 0955   Vitals shown include unvalidated device data.    Electronically Signed By: JULIO Benavidez CRNA  November 7, 2022  9:56 AM

## 2022-11-07 NOTE — PROGRESS NOTES
11/7/2022    Yoly Chan    Alert and oriented in PACU    Pain well controlled    CMS intact (femoral and sciatic)    X-rays look great.

## 2022-11-07 NOTE — BRIEF OP NOTE
[facility]    Brief Operative Note    Pre-operative diagnosis: Primary osteoarthritis of right hip [M16.11]  Post-operative diagnosis osteoarthritis right hip    Procedure: Procedure(s):  RIGHT TOTAL HIP ARTHROPLASTY, DIRECT ANTERIOR APPROACH  Surgeon: Surgeon(s) and Role:     * Barak Taylor MD - Primary     * Jahaira Mtz - Assisting  Anesthesia: Choice   Estimated Blood Loss: 450 ml    Drains: None  Specimens: * No specimens in log *  Findings:   None.  Complications: None.  Implants:   Implant Name Type Inv. Item Serial No.  Lot No. LRB No. Used Action   IMP SHELL BIOM G7 ACETAB PPS BLAIR HOLE 52MM  E [id number] - [id number] Total Joint Component/Insert IMP SHELL BIOM G7 ACETAB PPS BLAIR HOLE 52MM  E [id number]  [employer] [id number] Right 1 Implanted   IMP SCR ZIM 6.5X30MM ACET CUP SELF TAP -684-30 - [id number] Metallic Hardware/[place] IMP SCR ZIM 6.5X30MM ACET CUP SELF TAP -945-30  [employer] [id number] Right 1 Implanted   LINER ACTB G7 E 36MM VIVACIT-E HIP STRL LUM LF [id number] - [id number] Total Joint Component/Insert LINER ACTB G7 E 36MM VIVACIT-E HIP STRL LUM LF [id number]  [employer] [id number] Right 1 Implanted   IMP STEM FEM BIOM TAPERLOC STD OFFSET TYPE 1 [facility] -120 - [id number] Total Joint Component/Insert IMP STEM FEM BIOM TAPERLOC STD OFFSET TYPE 1 [facility] -120  [employer] [id number] Right 1 Implanted   HEAD FEM 0MM OFST 36MM HIP ACTB MDLR TY 1 BLX D G7 - [id number] Total Joint Component/Insert HEAD FEM 0MM OFST 36MM HIP ACTB MDLR TY 1 BLX D G7  [employer] [id number] Right 1 Implanted

## 2022-11-07 NOTE — ANESTHESIA POSTPROCEDURE EVALUATION
Patient: Yoly Chan    Procedure: Procedure(s):  RIGHT TOTAL HIP ARTHROPLASTY, DIRECT ANTERIOR APPROACH       Anesthesia Type:  General    Note:  Disposition: Inpatient   Postop Pain Control: Uneventful            Sign Out: Well controlled pain   PONV: No   Neuro/Psych: Uneventful            Sign Out: Acceptable/Baseline neuro status   Airway/Respiratory: Uneventful            Sign Out: Acceptable/Baseline resp. status   CV/Hemodynamics: Uneventful            Sign Out: Acceptable CV status; No obvious hypovolemia; No obvious fluid overload   Other NRE: NONE   DID A NON-ROUTINE EVENT OCCUR? No           Last vitals:  Vitals Value Taken Time   /65 11/07/22 1200   Temp 35.9  C (96.6  F) 11/07/22 0954   Pulse 96 11/07/22 1200   Resp 11 11/07/22 1200   SpO2 99 % 11/07/22 1200       Electronically Signed By: Raymon Kwon MD  November 7, 2022  2:11 PM

## 2022-11-08 ENCOUNTER — APPOINTMENT (OUTPATIENT)
Dept: OCCUPATIONAL THERAPY | Facility: CLINIC | Age: 52
End: 2022-11-08
Attending: ORTHOPAEDIC SURGERY
Payer: OTHER GOVERNMENT

## 2022-11-08 ENCOUNTER — APPOINTMENT (OUTPATIENT)
Dept: PHYSICAL THERAPY | Facility: CLINIC | Age: 52
End: 2022-11-08
Attending: ORTHOPAEDIC SURGERY
Payer: OTHER GOVERNMENT

## 2022-11-08 VITALS
RESPIRATION RATE: 16 BRPM | DIASTOLIC BLOOD PRESSURE: 56 MMHG | OXYGEN SATURATION: 95 % | WEIGHT: 245 LBS | TEMPERATURE: 97.4 F | HEART RATE: 110 BPM | HEIGHT: 67 IN | SYSTOLIC BLOOD PRESSURE: 104 MMHG | BODY MASS INDEX: 38.45 KG/M2

## 2022-11-08 LAB
FASTING STATUS PATIENT QL REPORTED: NO
GLUCOSE BLD-MCNC: 133 MG/DL (ref 70–99)
HGB BLD-MCNC: 10.5 G/DL (ref 11.7–15.7)

## 2022-11-08 PROCEDURE — 82947 ASSAY GLUCOSE BLOOD QUANT: CPT | Performed by: ORTHOPAEDIC SURGERY

## 2022-11-08 PROCEDURE — 85018 HEMOGLOBIN: CPT | Performed by: PHYSICIAN ASSISTANT

## 2022-11-08 PROCEDURE — 97530 THERAPEUTIC ACTIVITIES: CPT | Mod: GP,CQ | Performed by: PHYSICAL THERAPY ASSISTANT

## 2022-11-08 PROCEDURE — 97110 THERAPEUTIC EXERCISES: CPT | Mod: GP,CQ | Performed by: PHYSICAL THERAPY ASSISTANT

## 2022-11-08 PROCEDURE — 97116 GAIT TRAINING THERAPY: CPT | Mod: GP,CQ | Performed by: PHYSICAL THERAPY ASSISTANT

## 2022-11-08 PROCEDURE — 36415 COLL VENOUS BLD VENIPUNCTURE: CPT | Performed by: ORTHOPAEDIC SURGERY

## 2022-11-08 PROCEDURE — 97535 SELF CARE MNGMENT TRAINING: CPT | Mod: GO | Performed by: OCCUPATIONAL THERAPIST

## 2022-11-08 PROCEDURE — 97166 OT EVAL MOD COMPLEX 45 MIN: CPT | Mod: GO | Performed by: OCCUPATIONAL THERAPIST

## 2022-11-08 PROCEDURE — 97530 THERAPEUTIC ACTIVITIES: CPT | Mod: GO | Performed by: OCCUPATIONAL THERAPIST

## 2022-11-08 PROCEDURE — 250N000013 HC RX MED GY IP 250 OP 250 PS 637: Performed by: PHYSICIAN ASSISTANT

## 2022-11-08 RX ADMIN — FAMOTIDINE 20 MG: 20 TABLET ORAL at 09:08

## 2022-11-08 RX ADMIN — ASPIRIN 81 MG: 81 TABLET, COATED ORAL at 09:08

## 2022-11-08 RX ADMIN — NORTRIPTYLINE HYDROCHLORIDE 50 MG: 50 CAPSULE ORAL at 09:08

## 2022-11-08 RX ADMIN — TOPIRAMATE 50 MG: 50 TABLET, FILM COATED ORAL at 09:08

## 2022-11-08 RX ADMIN — ACETAMINOPHEN 975 MG: 325 TABLET, FILM COATED ORAL at 05:10

## 2022-11-08 RX ADMIN — OXYCODONE HYDROCHLORIDE 5 MG: 5 TABLET ORAL at 07:24

## 2022-11-08 RX ADMIN — OXYCODONE HYDROCHLORIDE 5 MG: 5 TABLET ORAL at 02:11

## 2022-11-08 RX ADMIN — SENNOSIDES AND DOCUSATE SODIUM 1 TABLET: 50; 8.6 TABLET ORAL at 09:08

## 2022-11-08 ASSESSMENT — ACTIVITIES OF DAILY LIVING (ADL)
ADLS_ACUITY_SCORE: 22
PREVIOUS_RESPONSIBILITIES: WORK;DRIVING;FINANCES;MEDICATION MANAGEMENT;MEAL PREP;HOUSEKEEPING;LAUNDRY;SHOPPING
ADLS_ACUITY_SCORE: 22

## 2022-11-08 NOTE — PROGRESS NOTES
11/08/22 0749   Appointment Info   Signing Clinician's Name / Credentials (OT) Anna Marie Brewer, OTRL   Quick Adds   Quick Adds Certification   Living Environment   People in Home spouse   Current Living Arrangements house   Home Accessibility stairs to enter home   Number of Stairs, Main Entrance 2   Transportation Anticipated family or friend will provide;car, drives self   Self-Care   Usual Activity Tolerance good   Current Activity Tolerance good   Equipment Currently Used at Home walker, rolling;shower chair   Fall history within last six months no   Activity/Exercise/Self-Care Comment Walk in shower and shower chair. Higher toilets. reacher,m sock aide and LHSH   Instrumental Activities of Daily Living (IADL)   Previous Responsibilities work;driving;finances;medication management;meal prep;housekeeping;laundry;shopping   IADL Comments works in a [employer] full time   General Information   Onset of Illness/Injury or Date of Surgery 11/07/22   Referring Physician Jahaira Mtz   Patient/Family Therapy Goal Statement (OT) home   Additional Occupational Profile Info/Pertinent History of Current Problem RIGHT TOTAL HIP ARTHROPLASTY, DIRECT ANTERIOR APPROACH POD#1   Existing Precautions/Restrictions fall;weight bearing   Right Lower Extremity (Weight-bearing Status) weight-bearing as tolerated (WBAT)   Cognitive Status Examination   Orientation Status orientation to person, place and time   Affect/Mental Status (Cognitive) WNL   Follows Commands WNL   Sensory   Sensory Comments denies numbness   Pain Assessment   Patient Currently in Pain No   Range of Motion Comprehensive   Comment, General Range of Motion B UE AROM WFL   Bed Mobility   Scooting/Bridging [place] (Bed Mobility) modified independence   Supine-Sit [place] (Bed Mobility) modified independence   Transfer Skill: Bed to Chair/Chair to Bed   Bed-Chair [place] (Transfers)   (SBA)   Assistive Device (Bed-Chair Transfers)  rolling walker   Sit-Stand Transfer   Sit-Stand [place] (Transfers) contact guard   Toilet Transfer   [place] Level (Toilet Transfer) contact guard   Assistive Device (Toilet Transfer) walker, front-wheeled   Lower Body Dressing Assessment/Training   [place] Level (Lower Body Dressing) minimum assist (75% patient effort)   Clinical Impression   Criteria for Skilled Therapeutic Interventions Met (OT) Yes, treatment indicated   OT Diagnosis impaired I with ADL's and functional mobility   OT Problem List-Impairments impacting ADL problems related to;balance;strength;range of motion (ROM)   Assessment of Occupational Performance 3-5 Performance Deficits   Identified Performance Deficits impaired I with LE dress, toilet and walk in shower and car transfer, transportation, shopping, etc   Planned Therapy Interventions (OT) ADL retraining;transfer training   Clinical Decision Making Complexity (OT) moderate complexity   Risk & Benefits of therapy have been explained evaluation/treatment results reviewed;care plan/treatment goals reviewed;risks/benefits reviewed;current/potential barriers reviewed;participants voiced agreement with care plan;participants included;patient;spouse/significant other   OT Total Evaluation Time   OT Eval, Moderate Complexity Minutes (33075) 10   Therapy Certification   Medical Diagnosis s/p R LUIS ANTONIO, direct anterior approach   Start of Care Date 11/08/22   Certification date from 11/08/22   Certification date to 11/08/22   OT Goals   Therapy Frequency (OT) One time eval and treatment   OT Predicted Duration/Target Date for Goal Attainment 11/08/22   OT Goals Lower Body Dressing;Toilet Transfer/Toileting   OT: Lower Body Dressing Modified independent;using adaptive equipment;Goal Met   OT: Toilet Transfer/Toileting Modified independent;toilet transfer;cleaning and garment management;using adaptive equipment;Goal Met   Self-Care/Home Management   Self-Care/Home Mgmt/ADL, Compensatory,  Meal Prep Minutes (99558) 15   Treatment Detail/Skilled Intervention OT:Pt ed in AE use for LE dress, after education pt able to edda uw and pants with mod I, with initial cues for  reacher and sock aide use to doff/edda socks completed with mod I, pt ed in donning shoes with SH standing method and declined to trial at this time as will likely wear slippers most of the time. pt's  present and will pt as needed at home.   Therapeutic Activities   Therapeutic Activity Minutes (89651) 12   Symptoms noted during/after treatment fatigue   Treatment Detail/Skilled Intervention OT: sit <> stand with FWW with SBA cues for ww use after cues mod I, pt ambualtes to BR and back with FWW with S/mod I, completed toilet transfer simulating home set up and after cues for tech completed mod I. pt ed in walk ins hower and car transfer tech, pt and pts  receptive to info. no further OT needs.   OT Discharge Planning   OT Plan dc OT   OT Rationale for DC Rec Pt;s  will be able to A pt with ADL/IADL's with ie LE dress, shower, car transfers, transporation, shopping, etc   OT Brief overview of current status LE dress with AE and toilet transfer mod I.   Total Session Time   Timed Code Treatment Minutes 27   Total Session Time (sum of timed and untimed services) 37      [facility]  OUTPATIENT OCCUPATIONAL THERAPY  EVALUATION  PLAN OF TREATMENT FOR OUTPATIENT REHABILITATION  (COMPLETE FOR INITIAL CLAIMS ONLY)  Patient's Last Name, First Name, M.I.  YOB: 1970  oYly Chan                          Provider's Name  [facility] Medical Record No.  [id number]                             Onset Date:  11/07/22   Start of Care Date:  11/08/22   Type:     ___PT   _X_OT   ___SLP Medical Diagnosis:  s/p R LUIS ANTONIO, direct anterior approach                    OT Diagnosis:  impaired I with ADL's and functional mobility Visits from SOC:  1     See  note for plan of treatment, functional goals and certification details    I CERTIFY THE NEED FOR THESE SERVICES FURNISHED UNDER        THIS PLAN OF TREATMENT AND WHILE UNDER MY CARE     (Physician co-signature of this document indicates review and certification of the therapy plan).

## 2022-11-08 NOTE — PROGRESS NOTES
Reviewed discharge with pt and .  Verbalized understanding.  Paperwork & medications given to pt.  Pt to discharge home with .

## 2022-11-08 NOTE — PROGRESS NOTES
ORTHO PROGRESS NOTE      Procedure(s):  RIGHT TOTAL HIP ARTHROPLASTY, DIRECT ANTERIOR APPROACH   -1 Day Post-Op      Doing well.   Pain controlled adequately.  No concerns.  Denies lightheadedness or dizziness.    Vital Signs with Ranges  Temp:  [96.6  F (35.9  C)-98  F (36.7  C)] 97.7  F (36.5  C)  Pulse:  [] 107  Resp:  [7-16] 16  BP: (113-132)/(53-80) 123/53  SpO2:  [94 %-99 %] 96 %  I/O last 3 completed shifts:  In: 3550 [P.O.:1500; I.V.:2050]  Out: 1600 [Urine:1600]  No lab results found in last 7 days.    Dressing clean, dry and intact.  Calves soft bilaterally  Swelling appropriate for post operative condition  Able perform a quad contraction bilaterally  Able to dorsiflex at the ankle past neutral.      Plan:  -WBAT  -Progress Physical Therapy as able.  -Continue pain control measures  - ASA 81 mg BID at discharge for DVT propylaxis  -Discharge to to home today     Jahaira Mtz PA-C

## 2022-11-08 NOTE — PROGRESS NOTES
Occupational Therapy Discharge Summary    Reason for therapy discharge:    All goals and outcomes met, no further needs identified.    Progress towards therapy goal(s). See goals on Care Plan in [facility] electronic health record for goal details.  Goals met    Therapy recommendation(s):    Pt;s  will be able to A pt with ADL/IADL's with ie LE dress, shower, car transfers, transporation, shopping, etc

## 2022-11-08 NOTE — PROGRESS NOTES
Patient vital signs are at baseline: No,  Reason:  Tachycardia, pt asymptomatic, will continue to monitor  Patient able to ambulate as they were prior to admission or with assist devices provided by therapies during their stay:  Yes, A1, walker and gait belt  Patient MUST void prior to discharge:  Yes  Patient able to tolerate oral intake:  Yes  Pain has adequate pain control using Oral analgesics:  Yes  Does patient have an identified :  Yes  Has goal D/C date and time been discussed with patient:  Yes

## 2022-11-09 NOTE — PLAN OF CARE
Physical Therapy Discharge Summary    Reason for therapy discharge:    All goals and outcomes met, no further needs identified.    Progress towards therapy goal(s). See goals on Care Plan in [facility] electronic health record for goal details.  Goals met    Therapy recommendation(s):    Continue home exercise program.

## 2022-11-09 NOTE — DISCHARGE SUMMARY
Discharge Summary    Yoly Chan MRN# [id number]   YOB: 1970 Age: 52 year old     Date of Admission:  11/7/2022  Date of Discharge:  11/8/2022 10:13 AM  Admitting Physician:  Barak Taylor MD  Discharge Physician:  Barak Taylor MD     Primary Provider: Becki Rizzo          Admission Diagnoses:   Osteoarthritis right hip          Discharge Diagnosis:   Same           Surgical Procedure:   right hip replacement           Secondary Diagnosis:   N/A           Discharge Disposition:   Discharged to home              Discharge Medications:     Discharge Medication List as of 11/8/2022  9:50 AM      START taking these medications    Details   !! acetaminophen (TYLENOL) 325 MG tablet Take 2 tablets (650 mg) by mouth every 4 hours as needed for other (mild pain), Disp-100 tablet, R-0, E-Prescribe      aspirin 81 MG EC tablet Take 1 tablet (81 mg) by mouth 2 times daily, Disp-60 tablet, R-0, E-Prescribe      oxyCODONE (ROXICODONE) 5 MG tablet Take 1-2 tablets (5-10 mg) by mouth every 4 hours as needed for moderate to severe pain, Disp-26 tablet, R-0, E-Prescribe      polyethylene glycol (MIRALAX) 17 g packet Take 17 g by mouth daily, Disp-7 packet, R-0, E-Prescribe      senna-docusate (SENOKOT-S/PERICOLACE) 8.6-50 MG tablet Take 1-2 tablets by mouth 2 times daily Take while on oral narcotics to prevent or treat constipation., Disp-30 tablet, R-0, E-PrescribeWhile taking narcotics       !! - Potential duplicate medications found. Please discuss with provider.      CONTINUE these medications which have NOT CHANGED    Details   !! Acetaminophen (TYLENOL PO) Take 1,000 mg by mouth every 6 hours as needed for mild pain or fever, Historical      cimetidine (TAGAMET) 200 MG tablet Take 200 mg by mouth 2 times daily as needed, Historical      citalopram (CELEXA) 20 MG tablet Take 20 mg by mouth every morning, Historical      multivitamin (CENTRUM SILVER) tablet Take 1 tablet  by mouth daily, Historical      nortriptyline (PAMELOR) 50 MG capsule Take 50 mg by mouth 2 times daily, Historical      ondansetron (ZOFRAN ODT) 4 MG ODT tab Take 4 mg by mouth every 8 hours as needed for nausea, Historical      topiramate (TOPAMAX) 50 MG tablet Take 50 mg by mouth 2 times daily, Historical      traZODone (DESYREL) 50 MG tablet Take 50 mg by mouth At Bedtime, Historical      triamcinolone (ARISTOCORT HP) 0.5 % external cream Apply topically 2 times daily as neededHistorical      loratadine (CLARITIN) 10 MG tablet Take 10 mg by mouth daily, Historical      SUMAtriptan (IMITREX) 100 MG tablet Take 100 mg by mouth as needed for migraine, Historical       !! - Potential duplicate medications found. Please discuss with provider.                Consultations:   No consultations were requested during this admission           Hospital Course:   The patient was admitted after the surgical procedure.  The patient underwent an uneventfulright hip replacement. Postoperatively, anticoagulation with ASA. No transfusion was required. The patient will be discharged to home. Home medications have been reconciled. oxycodone 5 mg. was prescribed for pain. ASA  will be prescribed.             Pending Results:   None           Discharge Instructions and Follow-Up:        Discharge activity: use a walker   Discharge follow-up: Follow up with Dr. Taylor in 2 weeks   Outpatient therapy: None    Home Care agency: None    Supplies and equipment: Walker        Wound care: leave dressing in place   Other instructions: None

## (undated) DEVICE — DRAPE IOBAN INCISE 23X17" 6650EZ

## (undated) DEVICE — GLOVE BIOGEL PI MICRO INDICATOR UNDERGLOVE SZ 8.5 48985

## (undated) DEVICE — Device

## (undated) DEVICE — GLOVE BIOGEL PI SZ 8.5 40885

## (undated) DEVICE — SU MONOCRYL 3-0 PS-2 27" Y427H

## (undated) DEVICE — MANIFOLD NEPTUNE 4 PORT 700-20

## (undated) DEVICE — SU ETHIBOND 2 V-37 4X30" MX69G

## (undated) DEVICE — DRAPE C-ARM 60X42" 1013

## (undated) DEVICE — ESU BIPOLAR SEALER AQUAMANTYS 6MM 23-112-1

## (undated) DEVICE — SOLUTION WOUND CLEANSING 3/4OZ 10% PVP EA-L3011FB-50

## (undated) DEVICE — KIT PATIENT CARE HANA TABLE PROFX SUPINE 6855

## (undated) DEVICE — SUCTION TIP YANKAUER STR K87

## (undated) DEVICE — SUCTION IRR SYSTEM W/O TIP INTERPULSE HANDPIECE 0210-100-000

## (undated) DEVICE — NDL 18GA 1.5" 305196

## (undated) DEVICE — SU VICRYL 2-0 CP-1 27" UND J266H

## (undated) DEVICE — SOL WATER IRRIG 1000ML BOTTLE 2F7114

## (undated) DEVICE — DRAPE IOBAN ISOLATION VERTICAL 320X21CM 6617

## (undated) DEVICE — ADH SKIN CLOSURE PREMIERPRO EXOFIN 1.0ML 3470

## (undated) DEVICE — DRAPE CONVERTORS U-DRAPE 60X72" 8476

## (undated) DEVICE — PACK TOTAL HIP W/U DRAPE SOP15HUFSC

## (undated) DEVICE — ESU GROUND PAD UNIVERSAL W/O CORD

## (undated) DEVICE — SU VICRYL 0 CP-1 27" J467H

## (undated) DEVICE — SYR 50ML LL W/O NDL 309653

## (undated) DEVICE — DRSG XEROFORM 5X9" 8884431605

## (undated) DEVICE — SU VICRYL 1 CT 36" J959H

## (undated) DEVICE — DRAPE SHEET REV FOLD 3/4 9349

## (undated) DEVICE — BLADE SAW SAGITTAL STRK 18X90X1.37MM HD SYS 6 6118-137-090

## (undated) DEVICE — LINEN TOWEL PACK X5 5464

## (undated) DEVICE — DRILL BIT BIOM QUICK CONNECTING RING LOCK 3.2X20MM 31-323220

## (undated) DEVICE — BONE CLEANING TIP INTERPULSE  0210-010-000

## (undated) RX ORDER — TRANEXAMIC ACID 650 MG/1
TABLET ORAL
Status: DISPENSED
Start: 2022-11-07

## (undated) RX ORDER — FENTANYL CITRATE 50 UG/ML
INJECTION, SOLUTION INTRAMUSCULAR; INTRAVENOUS
Status: DISPENSED
Start: 2022-11-07

## (undated) RX ORDER — LIDOCAINE HYDROCHLORIDE 20 MG/ML
INJECTION, SOLUTION EPIDURAL; INFILTRATION; INTRACAUDAL; PERINEURAL
Status: DISPENSED
Start: 2022-11-07

## (undated) RX ORDER — ONDANSETRON 2 MG/ML
INJECTION INTRAMUSCULAR; INTRAVENOUS
Status: DISPENSED
Start: 2022-11-07

## (undated) RX ORDER — PROPOFOL 10 MG/ML
INJECTION, EMULSION INTRAVENOUS
Status: DISPENSED
Start: 2022-11-07

## (undated) RX ORDER — HYDROMORPHONE HYDROCHLORIDE 1 MG/ML
INJECTION, SOLUTION INTRAMUSCULAR; INTRAVENOUS; SUBCUTANEOUS
Status: DISPENSED
Start: 2022-11-07

## (undated) RX ORDER — DEXAMETHASONE SODIUM PHOSPHATE 4 MG/ML
INJECTION, SOLUTION INTRA-ARTICULAR; INTRALESIONAL; INTRAMUSCULAR; INTRAVENOUS; SOFT TISSUE
Status: DISPENSED
Start: 2022-11-07

## (undated) RX ORDER — ACETAMINOPHEN 325 MG/1
TABLET ORAL
Status: DISPENSED
Start: 2022-11-07